# Patient Record
Sex: FEMALE | Race: WHITE
[De-identification: names, ages, dates, MRNs, and addresses within clinical notes are randomized per-mention and may not be internally consistent; named-entity substitution may affect disease eponyms.]

---

## 2018-06-29 ENCOUNTER — HOSPITAL ENCOUNTER (EMERGENCY)
Dept: HOSPITAL 41 - JD.ED | Age: 38
Discharge: HOME | End: 2018-06-29
Payer: MEDICAID

## 2018-06-29 VITALS — SYSTOLIC BLOOD PRESSURE: 128 MMHG | DIASTOLIC BLOOD PRESSURE: 93 MMHG
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
## 2018-06-29 DIAGNOSIS — Z88.8: ICD-10-CM

## 2018-06-29 DIAGNOSIS — S13.9XXA: Primary | ICD-10-CM

## 2018-06-29 DIAGNOSIS — M25.511: ICD-10-CM

## 2018-06-29 DIAGNOSIS — Z88.0: ICD-10-CM
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
## 2018-06-29 PROCEDURE — 99283 EMERGENCY DEPT VISIT LOW MDM: CPT

## 2018-06-29 NOTE — EDM.PDOC
ED HPI GENERAL MEDICAL PROBLEM





- General


Chief Complaint: Upper Extremity Injury/Pain


Stated Complaint: RIGHT SHOULDER AND NECK PAIN


Time Seen by Provider: 18 21:39


Source of Information: Reports: Patient


History Limitations: Reports: No Limitations





- History of Present Illness


INITIAL COMMENTS - FREE TEXT/NARRATIVE: 





This is a 37-year-old female. She was at [employer] this evening and apparently 

was shopping and she reached for a product that was about eye level with her 

right arm and had sudden onset of pain in her right neck and her right 

shoulder. This seems to have continued and she is holding her arm across her 

abdomen very stiffly with her shoulder elevated. She states as long as she 

stays in this position she doesn't have any pain in her neck or shoulder or 

down her arm. However she is complaining of some tingling in her fingers. She 

says she doesn't really know why she is having this pain because she hasn't 

fallen down, she's had no trauma, she didn't twist her neck or injure her 

shoulder recently. She denies any other acute symptoms.


  ** Right Shoulder


Pain Score (Numeric/FACES): 10





- Related Data


 Allergies











Allergy/AdvReac Type Severity Reaction Status Date / Time


 


aspirin Allergy  Rash Verified 09/26/15 17:04


 


bupropion HCl Allergy  Other Verified 09/26/15 17:04





[From Wellbutrin]     


 


Penicillins Allergy  Airway Verified 09/26/15 17:04





   Tightness  


 


celexa Allergy  Other Uncoded 09/26/15 17:04











Home Meds: 


 Home Meds





Hydrocodone/Acetaminophen [Hydrocodon-Acetaminophen 5-325] 1 each PO Q6H PRN #

12 tablet 18 [Rx]


Orphenadrine [Norflex] 100 mg PO BID PRN #12 tab.er 18 [Rx]











Past Medical History





- Past Health History


Medical/Surgical History: Denies Medical/Surgical History





- Past Surgical History


GI Surgical History: Reports: Appendectomy, Cholecystectomy


Female  Surgical History: Reports:  Section





Social & Family History





- Tobacco Use


Smoking Status *Q: Never Smoker





- Caffeine Use


Caffeine Use: Reports: Coffee, Energy Drinks, Soda, Tea





- Recreational Drug Use


Recreational Drug Use: No





Review of Systems





- Review of Systems


Review Of Systems: See Below


Constitutional: Reports: No Symptoms


Eyes: Reports: No Symptoms


Ears: Reports: No Symptoms


Nose: Reports: No Symptoms


Mouth/Throat: Reports: No Symptoms


Respiratory: Reports: No Symptoms


Cardiovascular: Reports: No Symptoms


GI/Abdominal: Reports: No Symptoms


Genitourinary: Reports: No Symptoms


Musculoskeletal: Reports: Other (As per history of present illness)


Skin: Reports: No Symptoms


Neurological: Reports: No Symptoms


Psychiatric: Reports: No Symptoms





ED EXAM, GENERAL





- Physical Exam


Exam: See Below


Exam Limited By: No Limitations


General Appearance: Alert, WD/WN, No Apparent Distress


Eye Exam: Bilateral Eye: Normal Inspection


Ears: Normal External Exam


Nose: Normal Inspection


Throat/Mouth: Normal Inspection, Normal Lips, Normal Voice, No Airway Compromise


Head: Normocephalic


Neck: Other (The patient does move her neck with some rotation though she 

complains of soreness on the left paraspinal muscles on palpation and 

especially the trapezius muscle is tender on palpation, when I palpate the 

trapezius muscle she breaks out and cries and as soon as I quit palpating she 

stops crying she has limited flexion but she is able to extend her neck some)


Respiratory/Chest: No Respiratory Distress


Back Exam: Normal Inspection


Extremities: Other (The right shoulder is also very tender on palpation but not 

the shoulder joint itself the trapezius muscle and slightly in the upper back, 

she is able to relax her shoulder and she has no significant pain when she does 

so and I encouraged her not to hold that shoulder up because it'll make it 

stiff and more sore, the tingling in her fingers has essentially resolved once 

she relaxed her shoulder, she is able to move her arm away from her abdomen but 

she doesn't want to reach this seems to aggravate her symptoms, neurovascular 

is intact in all 5 digits of that right arm)


Neurological: Alert, Oriented


Psychiatric: Normal Mood, Tearful


Skin Exam: Warm, Dry





Course





- Vital Signs


Last Recorded V/S: 


 Last Vital Signs











Temp  98.6 F   18 22:12


 


Pulse  90   18 22:12


 


Resp  20   18 22:12


 


BP  128/93 H  18 22:12


 


Pulse Ox  98   18 22:12














- Orders/Labs/Meds


Orders: 


 Active Orders 24 hr











 Category Date Time Status


 


 Communication Order [RC] STAT Care  18 22:29 Active











Meds: 


Medications














Discontinued Medications














Generic Name Dose Route Start Last Admin





  Trade Name Freq  PRN Reason Stop Dose Admin


 


Hydrocodone Bitart/Acetaminophen  1 tab  18 22:31  





  Norco 325-5 Mg  PO  18 22:32  





  ONETIME ONE   





     





     





     





     


 


Orphenadrine Citrate  100 mg  18 22:31  





  Norflex  PO  18 22:32  





  NOW STA   





     





     





     





     














- Re-Assessments/Exams


Free Text/Narrative Re-Assessment/Exam: 





18 23:12


After placing the sling on the patient she does feel somewhat better. I 

instructed her to take the pain medication and a muscle relaxer once she gets 

home. I'll provide a prescription for the next couple of days for some 

medication. She is to follow-up with her family doctor this coming week for 

recheck.





Departure





- Departure


Time of Disposition: 23:14


Disposition: Home, Self-Care 01


Condition: Good


Clinical Impression: 


 Muscle spasms of neck





Acute cervical sprain


Qualifiers:


 Encounter type: initial encounter Qualified Code(s): S13.9XXA - Sprain of 

joints and ligaments of unspecified parts of neck, initial encounter





Right shoulder pain


Qualifiers:


 Chronicity: acute Qualified Code(s): M25.511 - Pain in right shoulder








- Discharge Information


Prescriptions: 


Hydrocodone/Acetaminophen [Hydrocodon-Acetaminophen 5-325] 1 each PO Q6H PRN #

12 tablet


 PRN Reason: Pain


Orphenadrine [Norflex] 100 mg PO BID PRN #12 tab.er


 PRN Reason: Spasms


Referrals: 


Stefan Johnson MD [Primary Care Provider] - 


Forms:  ED Department Discharge


Additional Instructions: 


When you are up wear the sling on the right arm and try to relax your right 

shoulder and don't hold it up because that will make your symptoms worse, take 

the medicines as needed for pain and spasms, use ice to the neck and the 

shoulder on and off for the next 2 days and then you may use heat after that, 

follow up with your family doctor next week for recheck, return to the ER as 

needed





- My Orders


Last 24 Hours: 


My Active Orders





18 22:29


Communication Order [RC] STAT 














- Assessment/Plan


Last 24 Hours: 


My Active Orders





18 22:29


Communication Order [RC] STAT

## 2020-09-12 NOTE — EDM.PDOC
ED HPI GENERAL MEDICAL PROBLEM





- General


Chief Complaint: Lower Extremity Injury/Pain


Stated Complaint: R ANKLE INJURY/FALL


Time Seen by Provider: 20 18:07


Source of Information: Reports: Patient, RN Notes Reviewed





- History of Present Illness


INITIAL COMMENTS - FREE TEXT/NARRATIVE: 





39 yr old female comes in with R ankle injury. She tripped going down some 

stairs with injury to R ankle.  Pain lateral ankle at rest. Very painful to 

walk. No other injury.  


  ** Ankle


Pain Score (Numeric/FACES): 10





- Related Data


                                    Allergies











Allergy/AdvReac Type Severity Reaction Status Date / Time


 


aspirin Allergy  Rash Verified 20 18:04


 


bupropion HCl Allergy  Other Verified 20 18:04





[From Wellbutrin]     


 


Penicillins Allergy  Airway Verified 20 18:04





   Tightness  


 


celexa Allergy  Other Uncoded 20 18:04











Home Meds: 


                                    Home Meds





. [No Known Home Meds]  20 [History]











Past Medical History





- Past Health History


Medical/Surgical History: Denies Medical/Surgical History





- Past Surgical History


GI Surgical History: Reports: Appendectomy, Cholecystectomy


Female  Surgical History: Reports:  Section





Social & Family History





- Tobacco Use


Smoking Status *Q: Former Smoker


Used Tobacco, but Quit: Yes


Month/Year Tobacco Last Used: 3 years


Second Hand Smoke Exposure: No





- Caffeine Use


Caffeine Use: Reports: None





- Recreational Drug Use


Recreational Drug Use: No





Review of Systems





- Review of Systems


Review Of Systems: See Below


Constitutional: Reports: No Symptoms


Ears: Reports: No Symptoms


Nose: Reports: No Symptoms


Mouth/Throat: Reports: No Symptoms


Respiratory: Denies: Shortness of Breath


Cardiovascular: Denies: Chest Pain


GI/Abdominal: Denies: Abdominal Pain


Musculoskeletal: Reports: Joint Pain (R ankle)


Skin: Reports: No Symptoms





ED EXAM, GENERAL





- Physical Exam


Exam: See Below


General Appearance: Alert, No Apparent Distress


Head: Atraumatic


Respiratory/Chest: No Respiratory Distress


Extremities: Other (Tender R lateral ankle, medial ankle nontender, leg 

otherwise nontender)


Neurological: Alert, No Motor/Sensory Deficits


Skin Exam: Warm, Dry, Normal Color





Course





- Vital Signs


Last Recorded V/S: 


                                Last Vital Signs











Temp  98 F   20 18:03


 


Pulse  95   20 18:03


 


Resp  16   20 18:03


 


BP  132/87   20 18:03


 


Pulse Ox  97   20 18:03














- Orders/Labs/Meds


Orders: 


                               Active Orders 24 hr











 Category Date Time Status


 


 Ankle Min 3V Rt [CR] Stat Exams  20 18:21 Taken


 


 Durable Medical Equipment for Discharge [DME for Oth  20 19:13 Ordered





 Discharge] [COMM] Stat   














- Re-Assessments/Exams


Free Text/Narrative Re-Assessment/Exam: 





20 19:00.  no fx








Departure





- Departure


Time of Disposition: 19:15


Disposition: Home, Self-Care 01


Preliminary Cause of Death *Q: Sepsis & Multi System Organ Failure


Clinical Impression: 


Ankle sprain


Qualifiers:


 Encounter type: initial encounter Involved ligament of ankle: unspecified 

ligament Laterality: left Qualified Code(s): S93.402A - Sprain of unspecified 

ligament of left ankle, initial encounter








- Discharge Information


Instructions:  Ankle Sprain, Easy-to-Read, Elastic Bandage and RICE Therapy


Referrals: 


Stefan Johnson MD [Primary Care Provider] - 


Forms:  ED Department Discharge


Additional Instructions: 


Ace wrap.  Rest ankle and foot.  Ice packs and elevation.  Tylenol or ibuprofen 

if needed for pain.  Follow up clinic in not much better within 5 to 7 days as 

expected. 





Sepsis Event Note (ED)





- Evaluation


Sepsis Screening Result: No Definite Risk





- Focused Exam


Vital Signs: 


                                   Vital Signs











  Temp Pulse Resp BP Pulse Ox


 


 20 18:03  98 F  95  16  132/87  97














- My Orders


Last 24 Hours: 


My Active Orders





20 18:21


Ankle Min 3V Rt [CR] Stat 





20 19:13


Durable Medical Equipment for Discharge [DME for Discharge] [COMM] Stat 














- Assessment/Plan


Last 24 Hours: 


My Active Orders





20 18:21


Ankle Min 3V Rt [CR] Stat 





20 19:13


Durable Medical Equipment for Discharge [DME for Discharge] [COMM] Stat

## 2020-09-13 NOTE — CR
Right ankle: 4 views of the right ankle were obtained.

 

Comparison: No previous ankle study.

 

Small plantar spur is noted.  Ankle mortise is symmetric.  No acute 

fracture, dislocation or other bony abnormality is appreciated.

 

Impression:

1.  Small plantar spur.

2.  Right ankle study is otherwise unremarkable.

 

Diagnostic code #2

 

This report was dictated in MDT